# Patient Record
Sex: FEMALE | NOT HISPANIC OR LATINO | URBAN - METROPOLITAN AREA
[De-identification: names, ages, dates, MRNs, and addresses within clinical notes are randomized per-mention and may not be internally consistent; named-entity substitution may affect disease eponyms.]

---

## 2017-11-01 ENCOUNTER — IMPORTED ENCOUNTER (OUTPATIENT)
Dept: URBAN - METROPOLITAN AREA CLINIC 1 | Facility: CLINIC | Age: 67
End: 2017-11-01

## 2017-11-01 PROBLEM — H04.123: Noted: 2017-11-01

## 2017-11-01 PROBLEM — H25.813: Noted: 2017-11-01

## 2017-11-01 PROBLEM — H16.143: Noted: 2017-11-01

## 2017-11-01 PROCEDURE — 92014 COMPRE OPH EXAM EST PT 1/>: CPT

## 2017-11-01 PROCEDURE — 92015 DETERMINE REFRACTIVE STATE: CPT

## 2017-11-01 NOTE — PATIENT DISCUSSION
1.  Cataract OU: Observe for now without intervention. The patient was advised to contact us if any change or worsening of vision2. GLORY w/ PEK OU- Recommend ATs BID OU routinely MRX for glasses givenFinalized CTL RXReturn for an appointment in 2 week cc with Dr. Erika Shelley.

## 2017-11-16 ENCOUNTER — IMPORTED ENCOUNTER (OUTPATIENT)
Dept: URBAN - METROPOLITAN AREA CLINIC 1 | Facility: CLINIC | Age: 67
End: 2017-11-16

## 2018-11-02 ENCOUNTER — IMPORTED ENCOUNTER (OUTPATIENT)
Dept: URBAN - METROPOLITAN AREA CLINIC 1 | Facility: CLINIC | Age: 68
End: 2018-11-02

## 2018-11-02 PROBLEM — H04.123: Noted: 2018-11-02

## 2018-11-02 PROBLEM — H25.813: Noted: 2018-11-02

## 2018-11-02 PROBLEM — H16.143: Noted: 2018-11-02

## 2018-11-02 PROCEDURE — 92014 COMPRE OPH EXAM EST PT 1/>: CPT

## 2018-11-02 PROCEDURE — 92015 DETERMINE REFRACTIVE STATE: CPT

## 2018-11-02 NOTE — PATIENT DISCUSSION
1.  GLORY w/ increased PEK OU- Slightly progressed OU. The increase of artificial tears OU to TID were recommended routinely. 2.  Cataract OU: Observe for now without intervention. The patient was advised to contact us if any change or worsening of vision3. CC today- [facility] OD- Distance/ OS- Near. [facility] [address]. Recommend increasing tears while wearing CTLs OU. 4. Return for an appointment for 30/glare/CC in 1 year with Dr. Zhanna Pascal.

## 2019-11-08 ENCOUNTER — IMPORTED ENCOUNTER (OUTPATIENT)
Dept: URBAN - METROPOLITAN AREA CLINIC 1 | Facility: CLINIC | Age: 69
End: 2019-11-08

## 2019-11-08 PROBLEM — H04.123: Noted: 2019-11-08

## 2019-11-08 PROBLEM — H25.813: Noted: 2019-11-08

## 2019-11-08 PROBLEM — H16.143: Noted: 2019-11-08

## 2019-11-08 PROCEDURE — 92014 COMPRE OPH EXAM EST PT 1/>: CPT

## 2019-11-08 PROCEDURE — 92015 DETERMINE REFRACTIVE STATE: CPT

## 2019-11-08 NOTE — PATIENT DISCUSSION
1.  GLORY w/ PEK OU-The use/continuation of artificial tears were recommended. 2.  Cataract OU: Observe for now without intervention. The patient was advised to contact us if any change or worsening of visionRecommend a daily CTL due to dryness OU. CTL trial given today MonoVA OD- Distance/ OS- Near. MRX for glasses given. Return for an appointment in 1 week cc with Dr. Eric Pepper.

## 2019-11-22 ENCOUNTER — IMPORTED ENCOUNTER (OUTPATIENT)
Dept: URBAN - METROPOLITAN AREA CLINIC 1 | Facility: CLINIC | Age: 69
End: 2019-11-22

## 2019-11-22 NOTE — PATIENT DISCUSSION
CC today- good fit [facility] comfort pt is happy with vision. CTL finalized today. Return for an appointment in 1 year 40/cc with Dr. Deb Pike.

## 2020-11-10 ENCOUNTER — IMPORTED ENCOUNTER (OUTPATIENT)
Dept: URBAN - METROPOLITAN AREA CLINIC 1 | Facility: CLINIC | Age: 70
End: 2020-11-10

## 2020-11-10 PROBLEM — H25.813: Noted: 2020-11-10

## 2020-11-10 PROBLEM — H16.143: Noted: 2020-11-10

## 2020-11-10 PROBLEM — H04.123: Noted: 2020-11-10

## 2020-11-10 PROCEDURE — 92014 COMPRE OPH EXAM EST PT 1/>: CPT

## 2020-11-10 PROCEDURE — 92015 DETERMINE REFRACTIVE STATE: CPT

## 2020-11-10 NOTE — PATIENT DISCUSSION
1.  GLORY w/ PEK OU- Recommended the use of AT's BID OU 2. Cataract OU- Observe for now without intervention. The patient was advised to contact us if any change or worsening of visionMRX for glasses given. Finalized CTL Rx ([facility])Return for an appointment in 1 yr 30/cc with Dr. Giuliana Shah.

## 2021-12-07 ENCOUNTER — IMPORTED ENCOUNTER (OUTPATIENT)
Dept: URBAN - METROPOLITAN AREA CLINIC 1 | Facility: CLINIC | Age: 71
End: 2021-12-07

## 2021-12-07 PROBLEM — H04.123: Noted: 2021-12-07

## 2021-12-07 PROBLEM — H16.143: Noted: 2021-12-07

## 2021-12-07 PROBLEM — H25.813: Noted: 2021-12-07

## 2021-12-07 PROCEDURE — 92015 DETERMINE REFRACTIVE STATE: CPT

## 2021-12-07 PROCEDURE — 99214 OFFICE O/P EST MOD 30 MIN: CPT

## 2021-12-07 NOTE — PATIENT DISCUSSION
1.  Cataract OU -- Observe for now without intervention. The patient was advised to contact us if any change or worsening of vision2. GLORY w/ PEK OU -- Recommended ATs TID OU routinely. Finalized [facility] CTL Rx and given to patient (DT1). OD Distance/OS Near. MRx for glasses given to patient. Return for an appointment in 1 year 30/cc with Dr. Sabrina Marie.

## 2022-04-02 ASSESSMENT — TONOMETRY
OS_IOP_MMHG: 16
OS_IOP_MMHG: 18
OS_IOP_MMHG: 17
OS_IOP_MMHG: 19
OD_IOP_MMHG: 17
OD_IOP_MMHG: 17
OS_IOP_MMHG: 18
OD_IOP_MMHG: 18
OD_IOP_MMHG: 16
OD_IOP_MMHG: 19

## 2022-04-02 ASSESSMENT — VISUAL ACUITY
OS_CC: J1
OD_SC: 20/20
OD_SC: 20/20
OD_CC: J1+
OS_CC: J2+2
OS_SC: 20/25+1
OD_SC: 20/20-1
OS_CC: J1+
OS_CC: J2
OD_SC: 20/20
OD_SC: 20/25
OS_SC: 20/40
OS_CC: J1
OS_CC: J1+
OD_SC: 20/25
OD_SC: 20/20
OS_CC: J1+

## 2022-12-07 ENCOUNTER — COMPREHENSIVE EXAM (OUTPATIENT)
Dept: URBAN - METROPOLITAN AREA CLINIC 1 | Facility: CLINIC | Age: 72
End: 2022-12-07

## 2022-12-07 PROCEDURE — 99214 OFFICE O/P EST MOD 30 MIN: CPT

## 2022-12-07 PROCEDURE — 92015 DETERMINE REFRACTIVE STATE: CPT

## 2022-12-07 PROCEDURE — 92310 CONTACT LENS FITTING OU: CPT

## 2022-12-07 ASSESSMENT — VISUAL ACUITY
OD_CC: 20/25
OS_CC: J1+

## 2022-12-07 ASSESSMENT — TONOMETRY
OS_IOP_MMHG: 19
OD_IOP_MMHG: 19

## 2023-12-13 ENCOUNTER — COMPREHENSIVE EXAM (OUTPATIENT)
Dept: URBAN - METROPOLITAN AREA CLINIC 1 | Facility: CLINIC | Age: 73
End: 2023-12-13

## 2023-12-13 DIAGNOSIS — H25.813: ICD-10-CM

## 2023-12-13 DIAGNOSIS — H16.143: ICD-10-CM

## 2023-12-13 DIAGNOSIS — Z46.0: ICD-10-CM

## 2023-12-13 DIAGNOSIS — H04.123: ICD-10-CM

## 2023-12-13 PROCEDURE — 92015 DETERMINE REFRACTIVE STATE: CPT

## 2023-12-13 PROCEDURE — 92310-12 CONTACT LENS FITTING - 90

## 2023-12-13 PROCEDURE — 92014 COMPRE OPH EXAM EST PT 1/>: CPT

## 2023-12-13 ASSESSMENT — VISUAL ACUITY
OD_BAT: 20/30
OD_CC: 20/25-1
OS_BAT: 20/30
OU_CC: 20/20-1
OS_CC: J1+
OU_CC: J1+

## 2023-12-13 ASSESSMENT — TONOMETRY
OD_IOP_MMHG: 20
OS_IOP_MMHG: 20

## 2024-12-11 ENCOUNTER — COMPREHENSIVE EXAM (OUTPATIENT)
Age: 74
End: 2024-12-11

## 2024-12-11 DIAGNOSIS — H16.143: ICD-10-CM

## 2024-12-11 DIAGNOSIS — H04.123: ICD-10-CM

## 2024-12-11 DIAGNOSIS — Z46.0: ICD-10-CM

## 2024-12-11 DIAGNOSIS — H25.813: ICD-10-CM

## 2024-12-11 PROCEDURE — 99214 OFFICE O/P EST MOD 30 MIN: CPT

## 2024-12-11 PROCEDURE — 92015 DETERMINE REFRACTIVE STATE: CPT

## 2024-12-11 PROCEDURE — 92310-3 LEVEL 3 SOFT LENS UPDATE
